# Patient Record
Sex: MALE | Race: WHITE | NOT HISPANIC OR LATINO | Employment: FULL TIME | URBAN - METROPOLITAN AREA
[De-identification: names, ages, dates, MRNs, and addresses within clinical notes are randomized per-mention and may not be internally consistent; named-entity substitution may affect disease eponyms.]

---

## 2017-03-06 ENCOUNTER — HOSPITAL ENCOUNTER (EMERGENCY)
Facility: MEDICAL CENTER | Age: 32
End: 2017-03-06
Attending: EMERGENCY MEDICINE

## 2017-03-06 VITALS
HEART RATE: 78 BPM | RESPIRATION RATE: 14 BRPM | WEIGHT: 161.82 LBS | TEMPERATURE: 96.5 F | SYSTOLIC BLOOD PRESSURE: 133 MMHG | HEIGHT: 69 IN | OXYGEN SATURATION: 99 % | BODY MASS INDEX: 23.97 KG/M2 | DIASTOLIC BLOOD PRESSURE: 69 MMHG

## 2017-03-06 DIAGNOSIS — T20.00XA: ICD-10-CM

## 2017-03-06 DIAGNOSIS — T26.42XA: ICD-10-CM

## 2017-03-06 PROCEDURE — 700101 HCHG RX REV CODE 250: Performed by: EMERGENCY MEDICINE

## 2017-03-06 PROCEDURE — 99284 EMERGENCY DEPT VISIT MOD MDM: CPT

## 2017-03-06 RX ORDER — HYDROCODONE BITARTRATE AND ACETAMINOPHEN 5; 325 MG/1; MG/1
1-2 TABLET ORAL EVERY 4 HOURS PRN
Qty: 10 TAB | Refills: 0 | Status: SHIPPED | OUTPATIENT
Start: 2017-03-06 | End: 2020-03-16

## 2017-03-06 RX ORDER — PROPARACAINE HYDROCHLORIDE 5 MG/ML
1 SOLUTION/ DROPS OPHTHALMIC ONCE
Status: COMPLETED | OUTPATIENT
Start: 2017-03-06 | End: 2017-03-06

## 2017-03-06 RX ORDER — ERYTHROMYCIN 5 MG/G
1 OINTMENT OPHTHALMIC
Qty: 1 TUBE | Refills: 0 | Status: SHIPPED | OUTPATIENT
Start: 2017-03-06

## 2017-03-06 RX ADMIN — PROPARACAINE HYDROCHLORIDE 1 DROP: 5 SOLUTION/ DROPS OPHTHALMIC at 08:00

## 2017-03-06 ASSESSMENT — LIFESTYLE VARIABLES: DO YOU DRINK ALCOHOL: NO

## 2017-03-06 ASSESSMENT — PAIN SCALES - GENERAL: PAINLEVEL_OUTOF10: 0
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
## 2017-03-06 NOTE — DISCHARGE INSTRUCTIONS
Burn Care  Burns hurt your skin. When your skin is hurt, it is easier to get an infection. Follow your doctor's directions to help prevent an infection.  HOME CARE  · Wash your hands well before you change your bandage.  · Change your bandage as often as told by your doctor.  ¨ Remove the old bandage. If the bandage sticks, soak it off with cool, clean water.  ¨ Gently clean the burn with mild soap and water.  ¨ Pat the burn dry with a clean, dry cloth.  ¨ Put a thin layer of medicated cream on the burn.  ¨ Put a clean bandage on as told by your doctor.  ¨ Keep the bandage clean and dry.  · Raise (elevate) the burn for the first 24 hours. After that, follow your doctor's directions.  · Only take medicine as told by your doctor.  GET HELP RIGHT AWAY IF:   · You have too much pain.  · The skin near the burn is red, tender, puffy (swollen), or has red streaks.  · The burn area has yellowish white fluid (pus) or a bad smell coming from it.  · You have a fever.  MAKE SURE YOU:   · Understand these instructions.  · Will watch your condition.  · Will get help right away if you are not doing well or get worse.     This information is not intended to replace advice given to you by your health care provider. Make sure you discuss any questions you have with your health care provider.     Document Released: 09/26/2009 Document Revised: 03/11/2013 Document Reviewed: 05/09/2012  Share[employer] Interactive Patient Education ©2016 [employer].  Corneal Abrasion  The cornea is the clear covering at the front and center of the eye. When you look at the colored portion of the eye, you are looking through the cornea. It is a thin tissue made up of layers. The top layer is the most sensitive layer. A corneal abrasion happens if this layer is scratched or an injury causes it to come off.   HOME CARE  · You may be given drops or a medicated cream. Use the medicine as told by your doctor.  · A pressure patch may be put over the eye. If this is  done, follow your doctor's instructions for when to remove the patch. Do not drive or use machines while the eye patch is on. Judging distances is hard to do with a patch on.  · See your doctor for a follow-up exam if you are told to do so. It is very important that you keep this appointment.  GET HELP IF:   · You have pain, are sensitive to light, and have a scratchy feeling in one eye or both eyes.  · Your pressure patch keeps getting loose. You can blink your eye under the patch.  · You have fluid coming from your eye or the lids stick together in the morning.  · You have the same symptoms in the morning that you did with the first abrasion. This could be days, weeks, or months after the first abrasion healed.  MAKE SURE YOU:   · Understand these instructions.  · Will watch your condition.  · Will get help right away if you are not doing well or get worse.     This information is not intended to replace advice given to you by your health care provider. Make sure you discuss any questions you have with your health care provider.     Document Released: 06/05/2009 Document Revised: 10/08/2014 Document Reviewed: 08/25/2014  RxVault.in Interactive Patient Education ©2016 Elsevier Inc.

## 2017-03-06 NOTE — ED AVS SNAPSHOT
3/6/2017          Saman Borjas  [address] [place] [address]    Dear Saman:    [facility] wants to ensure your discharge home is safe and you or your loved ones have had all your questions answered regarding your care after you leave the hospital.    You may receive a telephone call within two days of your discharge.  This call is to make certain you understand your discharge instructions as well as ensure we provided you with the best care possible during your stay with us.     The call will only last approximately 3-5 minutes and will be done by a nurse.    Once again, we want to ensure your discharge home is safe and that you have a clear understanding of any next steps in your care.  If you have any questions or concerns, please do not hesitate to contact us, we are here for you.  Thank you for choosing [facility] for your healthcare needs.    Sincerely,    Rafael Nash    [facility]
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
## 2017-03-06 NOTE — ED AVS SNAPSHOT
[email or web address].com Access Code: [email or web address]  Expires: 4/5/2017  8:22 AM    Your email address is not on file at [facility].  Email Addresses are required for you to sign up for [email or web address], please contact [phone] to verify your personal information and to provide your email address prior to attempting to register for [email or web address].    Saman Borjas  [address] [place] [address]    MedTel.com  A secure, online tool to manage your health information     [facility]’s MedTel.com® is a secure, online tool that connects you to your personalized health information from the privacy of your home -- day or night - making it very easy for you to manage your healthcare. Once the activation process is completed, you can even access your medical information using the MedTel.com cale, which is available for free in the Apple Cale store or Google Play store.     To learn more about MedTel.com, visit [email or web address]    There are two levels of access available (as shown below):   My Chart Features  [facility] [place] Primary Care Doctor [facility]  Specialists [facility]  Urgent  Care Non-[facility] Primary Care Doctor   Email your healthcare team securely and privately 24/7 X X X    Manage appointments: schedule your next appointment; view details of past/upcoming appointments X      Request prescription refills. X      View recent personal medical records, including lab and immunizations X X X X   View health record, including health history, allergies, medications X X X X   Read reports about your outpatient visits, procedures, consult and ER notes X X X X   See your discharge summary, which is a recap of your hospital and/or ER visit that includes your diagnosis, lab results, and care plan X X  X     How to register for Exact Sciencest:  Once your e-mail address has been verified, follow the following steps to sign up for Exact Sciencest.     1. Go to  [email or web address]  2. Click on the Sign Up Now box, which takes you to the New Member Sign Up page. You will need to  provide the following information:  a. Enter your Next audience Access Code exactly as it appears at the top of this page. (You will not need to use this code after you’ve completed the sign-up process. If you do not sign up before the expiration date, you must request a new code.)   b. Enter your date of birth.   c. Enter your home email address.   d. Click Submit, and follow the next screen’s instructions.  3. Create a Next audience ID. This will be your Next audience login ID and cannot be changed, so think of one that is secure and easy to remember.  4. Create a Next audience password. You can change your password at any time.  5. Enter your Password Reset Question and Answer. This can be used at a later time if you forget your password.   6. Enter your e-mail address. This allows you to receive e-mail notifications when new information is available in Next audience.  7. Click Sign Up. You can now view your health information.    For assistance activating your Next audience account, call [phone]

## 2019-08-14 ENCOUNTER — APPOINTMENT (OUTPATIENT)
Dept: RADIOLOGY | Facility: MEDICAL CENTER | Age: 34
End: 2019-08-14
Attending: EMERGENCY MEDICINE

## 2019-08-14 ENCOUNTER — HOSPITAL ENCOUNTER (EMERGENCY)
Facility: MEDICAL CENTER | Age: 34
End: 2019-08-14
Attending: EMERGENCY MEDICINE

## 2019-08-14 VITALS
DIASTOLIC BLOOD PRESSURE: 63 MMHG | HEIGHT: 69 IN | TEMPERATURE: 99.1 F | BODY MASS INDEX: 25.18 KG/M2 | RESPIRATION RATE: 25 BRPM | SYSTOLIC BLOOD PRESSURE: 109 MMHG | HEART RATE: 88 BPM | WEIGHT: 170 LBS | OXYGEN SATURATION: 94 %

## 2019-08-14 DIAGNOSIS — D72.829 LEUKOCYTOSIS, UNSPECIFIED TYPE: ICD-10-CM

## 2019-08-14 DIAGNOSIS — M54.9 PAIN, UPPER BACK: ICD-10-CM

## 2019-08-14 LAB
ALBUMIN SERPL BCP-MCNC: 4.3 G/DL (ref 3.2–4.9)
ALBUMIN/GLOB SERPL: 1.5 G/DL
ALP SERPL-CCNC: 53 U/L (ref 30–99)
ALT SERPL-CCNC: 13 U/L (ref 2–50)
ANION GAP SERPL CALC-SCNC: 9 MMOL/L (ref 0–11.9)
AST SERPL-CCNC: 17 U/L (ref 12–45)
BASOPHILS # BLD AUTO: 0.3 % (ref 0–1.8)
BASOPHILS # BLD: 0.04 K/UL (ref 0–0.12)
BILIRUB SERPL-MCNC: 0.5 MG/DL (ref 0.1–1.5)
BUN SERPL-MCNC: 12 MG/DL (ref 8–22)
CALCIUM SERPL-MCNC: 9.3 MG/DL (ref 8.5–10.5)
CHLORIDE SERPL-SCNC: 105 MMOL/L (ref 96–112)
CO2 SERPL-SCNC: 22 MMOL/L (ref 20–33)
CREAT SERPL-MCNC: 1.03 MG/DL (ref 0.5–1.4)
D DIMER PPP IA.FEU-MCNC: <0.4 UG/ML (FEU) (ref 0–0.5)
EOSINOPHIL # BLD AUTO: 0.01 K/UL (ref 0–0.51)
EOSINOPHIL NFR BLD: 0.1 % (ref 0–6.9)
ERYTHROCYTE [DISTWIDTH] IN BLOOD BY AUTOMATED COUNT: 41.2 FL (ref 35.9–50)
GLOBULIN SER CALC-MCNC: 2.8 G/DL (ref 1.9–3.5)
GLUCOSE SERPL-MCNC: 146 MG/DL (ref 65–99)
HCT VFR BLD AUTO: 43 % (ref 42–52)
HGB BLD-MCNC: 14.5 G/DL (ref 14–18)
IMM GRANULOCYTES # BLD AUTO: 0.06 K/UL (ref 0–0.11)
IMM GRANULOCYTES NFR BLD AUTO: 0.4 % (ref 0–0.9)
LYMPHOCYTES # BLD AUTO: 0.88 K/UL (ref 1–4.8)
LYMPHOCYTES NFR BLD: 5.8 % (ref 22–41)
MCH RBC QN AUTO: 31.5 PG (ref 27–33)
MCHC RBC AUTO-ENTMCNC: 33.7 G/DL (ref 33.7–35.3)
MCV RBC AUTO: 93.5 FL (ref 81.4–97.8)
MONOCYTES # BLD AUTO: 1.23 K/UL (ref 0–0.85)
MONOCYTES NFR BLD AUTO: 8.1 % (ref 0–13.4)
NEUTROPHILS # BLD AUTO: 12.89 K/UL (ref 1.82–7.42)
NEUTROPHILS NFR BLD: 85.3 % (ref 44–72)
NRBC # BLD AUTO: 0 K/UL
NRBC BLD-RTO: 0 /100 WBC
PLATELET # BLD AUTO: 174 K/UL (ref 164–446)
PMV BLD AUTO: 10.9 FL (ref 9–12.9)
POTASSIUM SERPL-SCNC: 3.7 MMOL/L (ref 3.6–5.5)
PROT SERPL-MCNC: 7.1 G/DL (ref 6–8.2)
RBC # BLD AUTO: 4.6 M/UL (ref 4.7–6.1)
SODIUM SERPL-SCNC: 136 MMOL/L (ref 135–145)
TROPONIN T SERPL-MCNC: <6 NG/L (ref 6–19)
WBC # BLD AUTO: 15.1 K/UL (ref 4.8–10.8)

## 2019-08-14 PROCEDURE — 84484 ASSAY OF TROPONIN QUANT: CPT

## 2019-08-14 PROCEDURE — 80053 COMPREHEN METABOLIC PANEL: CPT

## 2019-08-14 PROCEDURE — 99285 EMERGENCY DEPT VISIT HI MDM: CPT

## 2019-08-14 PROCEDURE — 85379 FIBRIN DEGRADATION QUANT: CPT

## 2019-08-14 PROCEDURE — 71045 X-RAY EXAM CHEST 1 VIEW: CPT

## 2019-08-14 PROCEDURE — 700105 HCHG RX REV CODE 258: Performed by: EMERGENCY MEDICINE

## 2019-08-14 PROCEDURE — 96374 THER/PROPH/DIAG INJ IV PUSH: CPT

## 2019-08-14 PROCEDURE — 94760 N-INVAS EAR/PLS OXIMETRY 1: CPT

## 2019-08-14 PROCEDURE — 700111 HCHG RX REV CODE 636 W/ 250 OVERRIDE (IP): Performed by: EMERGENCY MEDICINE

## 2019-08-14 PROCEDURE — 85025 COMPLETE CBC W/AUTO DIFF WBC: CPT

## 2019-08-14 RX ORDER — KETOROLAC TROMETHAMINE 30 MG/ML
30 INJECTION, SOLUTION INTRAMUSCULAR; INTRAVENOUS ONCE
Status: COMPLETED | OUTPATIENT
Start: 2019-08-14 | End: 2019-08-14

## 2019-08-14 RX ORDER — IBUPROFEN 600 MG/1
600 TABLET ORAL EVERY 6 HOURS PRN
Qty: 20 TAB | Refills: 0 | Status: SHIPPED | OUTPATIENT
Start: 2019-08-14

## 2019-08-14 RX ORDER — SODIUM CHLORIDE 9 MG/ML
1000 INJECTION, SOLUTION INTRAVENOUS ONCE
Status: COMPLETED | OUTPATIENT
Start: 2019-08-14 | End: 2019-08-14

## 2019-08-14 RX ORDER — CYCLOBENZAPRINE HCL 10 MG
10 TABLET ORAL 3 TIMES DAILY PRN
Qty: 30 TAB | Refills: 0 | Status: SHIPPED | OUTPATIENT
Start: 2019-08-14 | End: 2020-03-16

## 2019-08-14 RX ADMIN — SODIUM CHLORIDE 1000 ML: 9 INJECTION, SOLUTION INTRAVENOUS at 09:29

## 2019-08-14 RX ADMIN — KETOROLAC TROMETHAMINE 30 MG: 30 INJECTION, SOLUTION INTRAMUSCULAR at 09:37

## 2019-08-14 NOTE — ED NOTES
Understanding of dc paperwork. Iv removed. Bleeding controlled. Bandage placed. Steady gait out of er

## 2019-08-14 NOTE — ED PROVIDER NOTES
ED Provider Note    Scribed for Chato Tomlinson M.D. by Rabia Traore. 8/14/2019  9:06 AM    Primary care provider: None noted  Means of arrival: EMS  History obtained from: Patient  History limited by: None    CHIEF COMPLAINT  Chief Complaint   Patient presents with   • Chest Pain     pt is complaining of upper rib pain radiatating to scapula area. woke up this morning today with pain. no hx. no trauma       HPI  Saman Borjas is a 34 y.o. male who presents to the Emergency Department for evaluation of acute, constant, severe right upper rib pain onset this morning. The patient notes that he went to sleep at his baseline but woke up with severe pain. He denies sleeping in any abnormal positions or in a new bed. His right upper rib pain radiates to his right shoulder blade and he describes his pain as sharp in nature. He states that his pain is exacerbated upon inhalation. Endorses lower back pain which he notes is his baseline. Negative recent traumas, cough, fever or abdominal pain. The patient has no major past medical history, takes no daily medications, and has no known allergies to medication. Denies past surgical history.       REVIEW OF SYSTEMS  Pertinent positives include rib pain, shoulder pain.   Pertinent negatives include no recent traumas, cough, fever or abdominal pain.    All other systems reviewed and negative. See HPI for further details.       PAST MEDICAL HISTORY  No major past medical history was reported.   SURGICAL HISTORY  patient denies any surgical history    SOCIAL HISTORY  Social History     Tobacco Use   • Smoking status: Current Every Day Smoker     Packs/day: 0.50     Types: Cigarettes   Substance Use Topics   • Alcohol use: Yes   • Drug use: Yes     Types: Inhaled      Social History     Substance and Sexual Activity   Drug Use Yes   • Types: Inhaled       FAMILY HISTORY  No family history reported.     CURRENT MEDICATIONS  The patient denies taking any daily medications.  "  ALLERGIES  No Known Allergies    PHYSICAL EXAM  VITAL SIGNS: /80   Pulse 86   Temp 37.3 °C (99.1 °F) (Tympanic)   Resp 16   Ht 1.753 m (5' 9\")   Wt 77.1 kg (170 lb)   SpO2 97%   BMI 25.10 kg/m²     Nursing note and vitals reviewed.  Constitutional: Well-developed and well-nourished. Mild distress secondary to pain.   HENT: Head is normocephalic and atraumatic. Oropharynx is clear and moist without exudate or erythema.   Eyes: Pupils are equal, round, and reactive to light. Conjunctiva are normal.   Cardiovascular: Normal rate and regular rhythm. No murmur heard. Normal radial pulses.   Pulmonary/Chest: Breath sounds normal. No wheezes or rales. Tenderness of the right thoracic paraspinal musculature. Noticeable discomfort with deep breath  Abdominal: Soft and non-tender. No distention   Musculoskeletal: Extremities exhibit normal range of motion without edema or tenderness. No calf tenderness or palpable cords. See chest.   Neurological: Awake, alert and oriented to person, place, and time. No focal deficits noted.  Skin: Skin is warm and dry. No rash.   Psychiatric: Normal mood and affect. Appropriate for clinical situation    DIAGNOSTIC STUDIES / PROCEDURES    LABS  Results for orders placed or performed during the hospital encounter of 08/14/19   CBC WITH DIFFERENTIAL   Result Value Ref Range    WBC 15.1 (H) 4.8 - 10.8 K/uL    RBC 4.60 (L) 4.70 - 6.10 M/uL    Hemoglobin 14.5 14.0 - 18.0 g/dL    Hematocrit 43.0 42.0 - 52.0 %    MCV 93.5 81.4 - 97.8 fL    MCH 31.5 27.0 - 33.0 pg    MCHC 33.7 33.7 - 35.3 g/dL    RDW 41.2 35.9 - 50.0 fL    Platelet Count 174 164 - 446 K/uL    MPV 10.9 9.0 - 12.9 fL    Neutrophils-Polys 85.30 (H) 44.00 - 72.00 %    Lymphocytes 5.80 (L) 22.00 - 41.00 %    Monocytes 8.10 0.00 - 13.40 %    Eosinophils 0.10 0.00 - 6.90 %    Basophils 0.30 0.00 - 1.80 %    Immature Granulocytes 0.40 0.00 - 0.90 %    Nucleated RBC 0.00 /100 WBC    Neutrophils (Absolute) 12.89 (H) 1.82 - 7.42 " K/uL    Lymphs (Absolute) 0.88 (L) 1.00 - 4.80 K/uL    Monos (Absolute) 1.23 (H) 0.00 - 0.85 K/uL    Eos (Absolute) 0.01 0.00 - 0.51 K/uL    Baso (Absolute) 0.04 0.00 - 0.12 K/uL    Immature Granulocytes (abs) 0.06 0.00 - 0.11 K/uL    NRBC (Absolute) 0.00 K/uL   COMP METABOLIC PANEL   Result Value Ref Range    Sodium 136 135 - 145 mmol/L    Potassium 3.7 3.6 - 5.5 mmol/L    Chloride 105 96 - 112 mmol/L    Co2 22 20 - 33 mmol/L    Anion Gap 9.0 0.0 - 11.9    Glucose 146 (H) 65 - 99 mg/dL    Bun 12 8 - 22 mg/dL    Creatinine 1.03 0.50 - 1.40 mg/dL    Calcium 9.3 8.5 - 10.5 mg/dL    AST(SGOT) 17 12 - 45 U/L    ALT(SGPT) 13 2 - 50 U/L    Alkaline Phosphatase 53 30 - 99 U/L    Total Bilirubin 0.5 0.1 - 1.5 mg/dL    Albumin 4.3 3.2 - 4.9 g/dL    Total Protein 7.1 6.0 - 8.2 g/dL    Globulin 2.8 1.9 - 3.5 g/dL    A-G Ratio 1.5 g/dL   TROPONIN   Result Value Ref Range    Troponin T <6 6 - 19 ng/L   D-DIMER   Result Value Ref Range    D-Dimer Screen <0.40 0.00 - 0.50 ug/mL (FEU)   ESTIMATED GFR   Result Value Ref Range    GFR If African American >60 >60 mL/min/1.73 m 2    GFR If Non African American >60 >60 mL/min/1.73 m 2       All labs reviewed by me.    RADIOLOGY  DX-CHEST-PORTABLE (1 VIEW)   Final Result      No pulmonary consolidation.        The radiologist's interpretation of all radiological studies have been reviewed by me.    COURSE & MEDICAL DECISION MAKING  Nursing notes, VS, PMSFHx reviewed in chart.       9:06 AM - Patient seen and examined at bedside. Plan of care was discussed with patient which includes treating him with IV fluids and Toradol for his symptoms. Lab work and imaging will also be performed to further evaluate his condition. Patient will be treated with Toradol 30 mg . Ordered D-Dimer, troponin, CMP, CBC with differential, Dx-chest to evaluate his symptoms. The differential diagnoses include but are not limited to: pneumonia, pneumothorax, pulmonary embolism, musculoskeletal pain     D-dimer is  negative effectively ruling out pulmonary embolism in this low risk patient.  Troponin is not elevated.  Chest x-ray does not demonstrate any evidence of pneumonia or pneumothorax.    10:43 AM Obtained the patient's lab results and radiology results which were overall unremarkable.     11:04 AM - The patient is stable for discharge at this time. I discussed his above findings were overall unremarkable and plans for discharge with a prescription for Motrin 600 mg which is to be taken every six hours as needed and Flexeril 10 mg which is to be taken up to three times daily as needed. He was instructed to return to the ED if his symptoms worsen. The patient will return for new or worsening symptoms and is stable at the time of discharge. Patient understands and agrees.    DISPOSITION:  Patient will be discharged home in stable condition.    FOLLOW UP:  [facility], Emergency Dept  [address] [facility] [address]  [phone]  Schedule an appointment as soon as possible for a visit       [address] [address]  [phone]  Schedule an appointment as soon as possible for a visit         OUTPATIENT MEDICATIONS:  Discharge Medication List as of 8/14/2019 10:56 AM      START taking these medications    Details   ibuprofen (MOTRIN) 600 MG Tab Take 1 Tab by mouth every 6 hours as needed., Disp-20 Tab, R-0, Print Rx Paper      cyclobenzaprine (FLEXERIL) 10 MG Tab Take 1 Tab by mouth 3 times a day as needed., Disp-30 Tab, R-0, Print Rx Paper               FINAL IMPRESSION  1. Pain, upper back    2. Leukocytosis, unspecified type          I, Rabia Traore (Roxana), am scribing for, and in the presence of, Chato Tomlinson M.D..    Electronically signed by: Rabia Traore (Roxana), 8/14/2019    IChato M.D. personally performed the services described in this documentation, as scribed by Rabia Traore in my presence, and it is both accurate and  complete.    C    The note accurately reflects work and decisions made by me.  Chato Tomlinson  8/14/2019  3:43 PM

## 2019-08-14 NOTE — ED TRIAGE NOTES
pt is complaining of upper rib pain radiatating to scapula area. woke up this morning today with pain. no hx. no trauma      ..  Vitals:    08/14/19 0901   BP: 127/80   Pulse: 86   Resp: 16   Temp: 37.3 °C (99.1 °F)   SpO2: 97%

## 2020-01-08 ENCOUNTER — HOSPITAL ENCOUNTER (EMERGENCY)
Facility: MEDICAL CENTER | Age: 35
End: 2020-01-08
Attending: EMERGENCY MEDICINE

## 2020-01-08 ENCOUNTER — APPOINTMENT (OUTPATIENT)
Dept: RADIOLOGY | Facility: MEDICAL CENTER | Age: 35
End: 2020-01-08
Attending: EMERGENCY MEDICINE

## 2020-01-08 VITALS
BODY MASS INDEX: 33.34 KG/M2 | TEMPERATURE: 96.8 F | RESPIRATION RATE: 18 BRPM | OXYGEN SATURATION: 98 % | HEART RATE: 62 BPM | WEIGHT: 220 LBS | DIASTOLIC BLOOD PRESSURE: 72 MMHG | HEIGHT: 68 IN | SYSTOLIC BLOOD PRESSURE: 118 MMHG

## 2020-01-08 DIAGNOSIS — R11.10 VOMITING AND DIARRHEA: ICD-10-CM

## 2020-01-08 DIAGNOSIS — R19.7 VOMITING AND DIARRHEA: ICD-10-CM

## 2020-01-08 DIAGNOSIS — R10.33 PERIUMBILICAL ABDOMINAL PAIN: ICD-10-CM

## 2020-01-08 LAB
ALBUMIN SERPL BCP-MCNC: 4.3 G/DL (ref 3.2–4.9)
ALBUMIN/GLOB SERPL: 1.8 G/DL
ALP SERPL-CCNC: 57 U/L (ref 30–99)
ALT SERPL-CCNC: 34 U/L (ref 2–50)
AMPHET UR QL SCN: NEGATIVE
ANION GAP SERPL CALC-SCNC: 9 MMOL/L (ref 0–11.9)
AST SERPL-CCNC: 28 U/L (ref 12–45)
BARBITURATES UR QL SCN: NEGATIVE
BASOPHILS # BLD AUTO: 0.4 % (ref 0–1.8)
BASOPHILS # BLD: 0.05 K/UL (ref 0–0.12)
BENZODIAZ UR QL SCN: NEGATIVE
BILIRUB SERPL-MCNC: 0.5 MG/DL (ref 0.1–1.5)
BUN SERPL-MCNC: 18 MG/DL (ref 8–22)
BZE UR QL SCN: NEGATIVE
CALCIUM SERPL-MCNC: 9.3 MG/DL (ref 8.5–10.5)
CANNABINOIDS UR QL SCN: POSITIVE
CHLORIDE SERPL-SCNC: 107 MMOL/L (ref 96–112)
CO2 SERPL-SCNC: 24 MMOL/L (ref 20–33)
CREAT SERPL-MCNC: 1.02 MG/DL (ref 0.5–1.4)
EOSINOPHIL # BLD AUTO: 0.06 K/UL (ref 0–0.51)
EOSINOPHIL NFR BLD: 0.4 % (ref 0–6.9)
ERYTHROCYTE [DISTWIDTH] IN BLOOD BY AUTOMATED COUNT: 38.8 FL (ref 35.9–50)
GLOBULIN SER CALC-MCNC: 2.4 G/DL (ref 1.9–3.5)
GLUCOSE SERPL-MCNC: 141 MG/DL (ref 65–99)
HCT VFR BLD AUTO: 42.3 % (ref 42–52)
HGB BLD-MCNC: 14.6 G/DL (ref 14–18)
IMM GRANULOCYTES # BLD AUTO: 0.04 K/UL (ref 0–0.11)
IMM GRANULOCYTES NFR BLD AUTO: 0.3 % (ref 0–0.9)
LIPASE SERPL-CCNC: 14 U/L (ref 11–82)
LYMPHOCYTES # BLD AUTO: 2.13 K/UL (ref 1–4.8)
LYMPHOCYTES NFR BLD: 15.4 % (ref 22–41)
MCH RBC QN AUTO: 31.4 PG (ref 27–33)
MCHC RBC AUTO-ENTMCNC: 34.5 G/DL (ref 33.7–35.3)
MCV RBC AUTO: 91 FL (ref 81.4–97.8)
METHADONE UR QL SCN: NEGATIVE
MONOCYTES # BLD AUTO: 0.91 K/UL (ref 0–0.85)
MONOCYTES NFR BLD AUTO: 6.6 % (ref 0–13.4)
NEUTROPHILS # BLD AUTO: 10.61 K/UL (ref 1.82–7.42)
NEUTROPHILS NFR BLD: 76.9 % (ref 44–72)
NRBC # BLD AUTO: 0 K/UL
NRBC BLD-RTO: 0 /100 WBC
OPIATES UR QL SCN: NEGATIVE
OXYCODONE UR QL SCN: NEGATIVE
PCP UR QL SCN: NEGATIVE
PLATELET # BLD AUTO: 214 K/UL (ref 164–446)
PMV BLD AUTO: 10.8 FL (ref 9–12.9)
POTASSIUM SERPL-SCNC: 3.7 MMOL/L (ref 3.6–5.5)
PROPOXYPH UR QL SCN: NEGATIVE
PROT SERPL-MCNC: 6.7 G/DL (ref 6–8.2)
RBC # BLD AUTO: 4.65 M/UL (ref 4.7–6.1)
SODIUM SERPL-SCNC: 140 MMOL/L (ref 135–145)
WBC # BLD AUTO: 13.8 K/UL (ref 4.8–10.8)

## 2020-01-08 PROCEDURE — 700111 HCHG RX REV CODE 636 W/ 250 OVERRIDE (IP): Performed by: EMERGENCY MEDICINE

## 2020-01-08 PROCEDURE — 700105 HCHG RX REV CODE 258: Performed by: EMERGENCY MEDICINE

## 2020-01-08 PROCEDURE — 80053 COMPREHEN METABOLIC PANEL: CPT

## 2020-01-08 PROCEDURE — 83690 ASSAY OF LIPASE: CPT

## 2020-01-08 PROCEDURE — 74019 RADEX ABDOMEN 2 VIEWS: CPT

## 2020-01-08 PROCEDURE — 85025 COMPLETE CBC W/AUTO DIFF WBC: CPT

## 2020-01-08 PROCEDURE — 96374 THER/PROPH/DIAG INJ IV PUSH: CPT

## 2020-01-08 PROCEDURE — 80307 DRUG TEST PRSMV CHEM ANLYZR: CPT

## 2020-01-08 PROCEDURE — 99285 EMERGENCY DEPT VISIT HI MDM: CPT

## 2020-01-08 RX ORDER — SODIUM CHLORIDE 9 MG/ML
1000 INJECTION, SOLUTION INTRAVENOUS ONCE
Status: COMPLETED | OUTPATIENT
Start: 2020-01-08 | End: 2020-01-08

## 2020-01-08 RX ORDER — LOPERAMIDE HYDROCHLORIDE 2 MG/1
TABLET ORAL
Qty: 20 TAB | Refills: 0 | Status: SHIPPED | OUTPATIENT
Start: 2020-01-08 | End: 2020-03-16

## 2020-01-08 RX ORDER — ONDANSETRON 2 MG/ML
4 INJECTION INTRAMUSCULAR; INTRAVENOUS
Status: DISCONTINUED | OUTPATIENT
Start: 2020-01-08 | End: 2020-01-08 | Stop reason: HOSPADM

## 2020-01-08 RX ORDER — ONDANSETRON 4 MG/1
4 TABLET, ORALLY DISINTEGRATING ORAL EVERY 8 HOURS PRN
Qty: 10 TAB | Refills: 0 | Status: SHIPPED | OUTPATIENT
Start: 2020-01-08 | End: 2023-06-25

## 2020-01-08 RX ADMIN — ONDANSETRON 4 MG: 2 INJECTION INTRAMUSCULAR; INTRAVENOUS at 17:34

## 2020-01-08 RX ADMIN — SODIUM CHLORIDE 1000 ML: 9 INJECTION, SOLUTION INTRAVENOUS at 17:34

## 2020-01-09 NOTE — ED TRIAGE NOTES
Pt. States vomiting starting today at approx 0800, pt. States approximately 30-40 episodes of vomiting or dry heaves. Pt. Denies trauma.

## 2020-01-09 NOTE — DISCHARGE INSTRUCTIONS
Vomiting    Take Zofran as needed for vomiting.  Drink frequent small volumes of fluids increasing the amounts each hour that you do not vomit.  Take lomotil or imodium OTC for diarrhea.  Return for abdominal pain (especially right sided), bloody vomit or stool or for uncontrolled vomiting or ill appearance.  See your doctor if not better in 3-4 days.    You had a borderline or high normal blood pressure reading today.  This does not necessarily mean you have hypertension.  Please followup with your/a primary physician for comprehensive blood pressure evaluation and yearly fasting cholesterol assessment.  BP Readings from Last 3 Encounters:   01/08/20 125/72   08/14/19 109/63   03/06/17 133/69          Vomiting can be caused by many different medical problems (stomach trouble, nervous system problems, alcohol and drug toxicity, and infections). Whatever the cause, repeated vomiting can lead to dehydration and severe weakness.  The treatment for vomiting includes:        Stay in bed. Do not drink or eat anything for at least 1 hour, or until the stomach settles.  Start drinking small amounts of clear liquids (water, flat sodas, Gatorade, diluted juices, broths, or herb tea) as tolerated.  Medication to stop vomiting (anti-emetic drugs) may be given by injection, rectal suppository, or orally, as needed.  After you can keep down clear liquids, other light foods (gelatin, soup, bread) can be started. Avoid alcohol, dairy products, and richer foods for several days until you are completely better.    Please call your doctor right away if your condition is not better in 1-2 days.  Call right away or go to the emergency room if you cannot take any oral fluids, if you vomit blood, if you have increased pain, fainting, dehydration, fever, or other serious symptoms.    Document Released: 12/18/2006    [employer]® Patient Information ©2008 [employer].
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
## 2020-03-15 ENCOUNTER — HOSPITAL ENCOUNTER (EMERGENCY)
Facility: MEDICAL CENTER | Age: 35
End: 2020-03-15
Attending: EMERGENCY MEDICINE

## 2020-03-15 VITALS
RESPIRATION RATE: 16 BRPM | TEMPERATURE: 97.7 F | OXYGEN SATURATION: 97 % | DIASTOLIC BLOOD PRESSURE: 76 MMHG | BODY MASS INDEX: 28.88 KG/M2 | HEIGHT: 69 IN | WEIGHT: 195 LBS | SYSTOLIC BLOOD PRESSURE: 121 MMHG | HEART RATE: 87 BPM

## 2020-03-15 DIAGNOSIS — L01.00 IMPETIGO: ICD-10-CM

## 2020-03-15 DIAGNOSIS — J02.0 STREP PHARYNGITIS: ICD-10-CM

## 2020-03-15 LAB
FLUAV RNA SPEC QL NAA+PROBE: NEGATIVE
FLUBV RNA SPEC QL NAA+PROBE: NEGATIVE
S PYO DNA SPEC NAA+PROBE: DETECTED

## 2020-03-15 PROCEDURE — 87651 STREP A DNA AMP PROBE: CPT

## 2020-03-15 PROCEDURE — 87502 INFLUENZA DNA AMP PROBE: CPT

## 2020-03-15 PROCEDURE — 99283 EMERGENCY DEPT VISIT LOW MDM: CPT

## 2020-03-15 RX ORDER — AMOXICILLIN 500 MG/1
500 CAPSULE ORAL 3 TIMES DAILY
Qty: 30 CAP | Refills: 0 | Status: SHIPPED | OUTPATIENT
Start: 2020-03-15 | End: 2020-03-15 | Stop reason: SDUPTHER

## 2020-03-15 RX ORDER — AMOXICILLIN 500 MG/1
500 CAPSULE ORAL 3 TIMES DAILY
Qty: 30 CAP | Refills: 0 | Status: SHIPPED | OUTPATIENT
Start: 2020-03-15 | End: 2020-03-25

## 2020-03-15 RX ORDER — VALACYCLOVIR HYDROCHLORIDE 1 G/1
1000 TABLET, FILM COATED ORAL 2 TIMES DAILY
Qty: 20 TAB | Refills: 0 | Status: SHIPPED | OUTPATIENT
Start: 2020-03-15 | End: 2022-05-03

## 2020-03-15 RX ORDER — VALACYCLOVIR HYDROCHLORIDE 1 G/1
1000 TABLET, FILM COATED ORAL 2 TIMES DAILY
Qty: 20 TAB | Refills: 0 | Status: SHIPPED | OUTPATIENT
Start: 2020-03-15 | End: 2020-03-15 | Stop reason: SDUPTHER

## 2020-03-15 ASSESSMENT — FIBROSIS 4 INDEX: FIB4 SCORE: 0.72

## 2020-03-15 NOTE — ED PROVIDER NOTES
"ED Provider Note    Scribed for Sharita Leigh M.D. by Oli Berg. 3/15/2020  11:47 AM    Primary care provider: None noted  Means of arrival: Walk-in  History obtained from: Patient  History limited by: None    CHIEF COMPLAINT  Chief Complaint   Patient presents with   • Cold Symptoms   • Dizziness       HPI  Saman Borjas is a 34 y.o. male who presents to the Emergency Department for flu like symptoms onset this morning. Patient notes that he is experiencing dizziness, sore throat, cough and rhinorrhea. Patient adds that he has a blister on his lip and that both of his ears feel \"full\". Patient denies any shortness of breath or fever. Patient notes no alleviating or exacerbating factors at this time.  He has not been exposed to anyone with COVID 19 and has had no recent travel to endemic areas.    REVIEW OF SYSTEMS  HEENT:  Sore throat, rhinorrhea,  ear pain no congestion  EYES: no discharge, redness, or vision changes  CARDIAC: no chest pain, no palpitations    PULMONARY: cough, no dyspnea or congestion   GI: no vomiting, diarrhea, or abdominal pain   : no dysuria, back pain, or hematuria   Neuro: Dizziness, no weakness, numbness, aphasia, or headache  Musculoskeletal: no swelling, deformity, pain, or joint swelling  Endocrine: no fevers, sweating, or weight loss   SKIN: blister on lip, no contusions     See history of present illness. All other systems are negative. C.    PAST MEDICAL HISTORY  None noted    SURGICAL HISTORY  patient denies any surgical history    SOCIAL HISTORY  Social History     Tobacco Use   • Smoking status: Former Smoker     Packs/day: 0.50     Types: Cigarettes   • Smokeless tobacco: Never Used   Substance Use Topics   • Alcohol use: Yes   • Drug use: Yes     Types: Inhaled     Comment: Marijuana      Social History     Substance and Sexual Activity   Drug Use Yes   • Types: Inhaled    Comment: Marijuana       FAMILY HISTORY  None noted    CURRENT MEDICATIONS  Home Medications     " "Reviewed by Kathy Recinos R.N. (Registered Nurse) on 03/15/20 at 1145  Med List Status: Not Addressed   Medication Last Dose Status   cyclobenzaprine (FLEXERIL) 10 MG Tab  Active   erythromycin 5 MG/GM Ointment  Active   hydrocodone-acetaminophen (NORCO) 5-325 MG Tab per tablet  Active   ibuprofen (MOTRIN) 600 MG Tab  Active   loperamide (IMODIUM A-D) 2 MG tablet  Active   ondansetron (ZOFRAN ODT) 4 MG TABLET DISPERSIBLE  Active                ALLERGIES  No Known Allergies    PHYSICAL EXAM  VITAL SIGNS: /76   Pulse 87   Temp 36.5 °C (97.7 °F) (Oral)   Resp 16   Ht 1.753 m (5' 9\")   Wt 88.5 kg (195 lb)   SpO2 97%   BMI 28.80 kg/m²     Constitutional: Alert, awake, Well developed, Well nourished, No acute distress, Non-toxic appearance.   HEENT: Rhinorrhea, mucosal edema, pharynx mildly erythematous, Normocephalic, Atraumatic, external ears normal, Mucous  Membranes moist.  Eyes: PERRL, EOMI, Conjunctiva normal, No discharge.   Neck: Normal range of motion, No tenderness, Supple, No stridor.   Lymphatic: No lymphadenopathy    Cardiovascular: Regular Rate and Rhythm, No murmurs,  rubs, or gallops.   Thorax & Lungs: Lungs clear to auscultation bilaterally, No respiratory distress, No wheezes, rhales or rhonchi, No chest wall tenderness.   Abdomen: Bowel sounds normal, Soft, non tender, non distended,  No pulsatile masses., no rebound guarding or peritoneal signs.   Skin: Cluster of vesicular lesions located on left side of face near nostril without drainage crusting or erythema or fluctuance, Warm, Dry  Back:  No CVA tenderness,  No spinal tenderness, bony crepitance, step offs, or instability.   Neurologic: Alert & oriented x 3, Normal motor function, Normal sensory function, No focal deficits noted. Normal reflexes. Normal Cranial Nerves.  Extremities: Equal, intact distal pulses, No cyanosis, clubbing or edema,  No tenderness.   Musculoskeletal: Good range of motion in all major joints. No tenderness " to palpation or major deformities noted.     DIAGNOSTIC STUDIES / PROCEDURES    LABS  Results for orders placed or performed during the hospital encounter of 03/15/20   Group A Strep by PCR   Result Value Ref Range    Group A Strep by PCR DETECTED (A) Not Detected   Influenza A/B By PCR (Adult - Flu Only)   Result Value Ref Range    Influenza virus A RNA Negative Negative    Influenza virus B, PCR Negative Negative       All labs reviewed by me.    COURSE & MEDICAL DECISION MAKING  Nursing notes, VS, PMSFHx reviewed in chart.    11:47 AM Patient seen and examined at bedside.  Ordered Group A strep by PCR and influenza A/B by PCR to evaluate his symptoms. The differential diagnoses include but are not limited to: Herpes Zoster vs. Strep throat vs. impetigo vs. Flu    1:30 PM - Patient was reevaluated at bedside. Discussed lab  results with the patient and informed them that they were to be discharged. Patient verbalized his understanding and agreement with the plan of care.      The patient will return for new or worsening symptoms and is stable at the time of discharge.    The patient is referred to a primary physician for blood pressure management, diabetic screening, and for all other preventative health concerns.      DISPOSITION:  Patient will be discharged home in stable condition.    FOLLOW UP:  [address] [address] [phone]  Call in 2 days  As needed, If symptoms worsen      OUTPATIENT MEDICATIONS:  Discharge Medication List as of 3/15/2020  1:21 PM      START taking these medications    Details   valacyclovir (VALTREX) 1 GM Tab Take 1 Tab by mouth 2 times a day., Disp-20 Tab, R-0, Normal      mupirocin (BACTROBAN) 2 % Ointment Apply 1 Application to affected area(s) 2 times a day for 7 days., Disp-1 Tube, R-0, Normal      amoxicillin (AMOXIL) 500 MG Cap Take 1 Cap by mouth 3 times a day for 10 days., Disp-30 Cap, R-0, Normal               FINAL  IMPRESSION  1. Strep pharyngitis    2. Impetigo          Oli ALAN (Scribe), am scribing for, and in the presence of, Sharita Leigh M.D..    Electronically signed by: Oli Berg (Roxana), 3/15/2020    Sharita ALAN M.D. personally performed the services described in this documentation, as scribed by Oli Berg in my presence, and it is both accurate and complete. E    The note accurately reflects work and decisions made by me.  Sharita Leigh M.D.  3/15/2020  2:37 PM

## 2020-03-15 NOTE — ED TRIAGE NOTES
Patient comes in with complaints of cold like symptoms that started today along with dizziness.  Positive nasal congestion, ear pain, smokes.

## 2020-03-16 ENCOUNTER — HOSPITAL ENCOUNTER (EMERGENCY)
Facility: MEDICAL CENTER | Age: 35
End: 2020-03-16
Attending: EMERGENCY MEDICINE

## 2020-03-16 VITALS
TEMPERATURE: 97.6 F | BODY MASS INDEX: 27.18 KG/M2 | WEIGHT: 184.08 LBS | SYSTOLIC BLOOD PRESSURE: 116 MMHG | RESPIRATION RATE: 18 BRPM | HEART RATE: 62 BPM | DIASTOLIC BLOOD PRESSURE: 75 MMHG | OXYGEN SATURATION: 96 %

## 2020-03-16 DIAGNOSIS — L01.00 IMPETIGO: ICD-10-CM

## 2020-03-16 DIAGNOSIS — J02.0 STREP THROAT: ICD-10-CM

## 2020-03-16 PROCEDURE — A9270 NON-COVERED ITEM OR SERVICE: HCPCS | Performed by: EMERGENCY MEDICINE

## 2020-03-16 PROCEDURE — 99284 EMERGENCY DEPT VISIT MOD MDM: CPT

## 2020-03-16 PROCEDURE — 700102 HCHG RX REV CODE 250 W/ 637 OVERRIDE(OP): Performed by: EMERGENCY MEDICINE

## 2020-03-16 RX ORDER — CEPHALEXIN 500 MG/1
500 CAPSULE ORAL ONCE
Status: COMPLETED | OUTPATIENT
Start: 2020-03-16 | End: 2020-03-16

## 2020-03-16 RX ORDER — CEPHALEXIN 500 MG/1
500 TABLET ORAL EVERY 6 HOURS
Qty: 28 TAB | Refills: 0 | Status: SHIPPED | OUTPATIENT
Start: 2020-03-16 | End: 2022-05-03

## 2020-03-16 RX ADMIN — CEPHALEXIN 500 MG: 500 CAPSULE ORAL at 15:31

## 2020-03-16 ASSESSMENT — FIBROSIS 4 INDEX: FIB4 SCORE: 0.72

## 2020-03-16 ASSESSMENT — PAIN SCALES - WONG BAKER: WONGBAKER_NUMERICALRESPONSE: DOESN'T HURT AT ALL

## 2020-03-16 NOTE — ED PROVIDER NOTES
ED Provider Note    CHIEF COMPLAINT  Chief Complaint   Patient presents with   • Follow-Up       HPI  Saman Borjas Jr. is a 34 y.o. male who presents because he was diagnosed with strep throat and impetigo yesterday and he was unable to afford mupirocin, valacyclovir and amoxicillin.  Patient's had sore throat with headache and congestion for 3 days without fever.  Has had a rash since yesterday that is now developed a second area on his face.  No blisters or history of herpes.  No travel, dyspnea or ill contacts with coronavirus infection    REVIEW OF SYSTEMS  Pertinent positives include: Strep throat and impetigo.  Pertinent negatives include: Fever, shortness of breath.    PAST MEDICAL HISTORY  Denies    SOCIAL HISTORY    Social History     Tobacco Use   • Smoking status: Former Smoker     Packs/day: 0.50     Types: Cigarettes   • Smokeless tobacco: Never Used   Substance Use Topics   • Alcohol use: Yes   • Drug use: Yes     Types: Inhaled     Comment: Marijuana     .    CURRENT MEDICATIONS  Home Medications     Reviewed by Randee Andrade R.N. (Registered Nurse) on 03/16/20 at 1421  Med List Status: Partial   Medication Last Dose Status   amoxicillin (AMOXIL) 500 MG Cap  Active   erythromycin 5 MG/GM Ointment  Active   ibuprofen (MOTRIN) 600 MG Tab  Active   mupirocin (BACTROBAN) 2 % Ointment  Active   ondansetron (ZOFRAN ODT) 4 MG TABLET DISPERSIBLE  Active   valacyclovir (VALTREX) 1 GM Tab  Active                ALLERGIES  No Known Allergies    PHYSICAL EXAM  VITAL SIGNS: /75   Pulse 63   Temp 36.4 °C (97.6 °F) (Temporal)   Resp 16   Wt 83.5 kg (184 lb 1.4 oz)   SpO2 96%   BMI 27.18 kg/m²   Constitutional :  Well developed, Well nourished, afebrile, no hypoxia room air well-appearing normal voice.   HNT: Oropharynx moist with minimal pharyngeal erythema but no exudate or edema.   Ears: External ears normal.  Eyes: pupils reactive without eye discharge nor conjunctival  hyperemia.  Cardiovascular: Regular rhythm, No murmurs, No rubs, No gallops.  No cyanosis.   Respiratory: No rales, rhonchi, wheeze  Abdomen:  Soft, nontender  Skin: 2 patches to 1.5 cm of raised pink crusted skin left cheek, no vesicle.   Musculoskeletal: no limb deformities.      LABORATORY: Labs from yesterday reviewed as below  Results for orders placed or performed during the hospital encounter of 03/15/20   Group A Strep by PCR   Result Value Ref Range    Group A Strep by PCR DETECTED (A) Not Detected   Influenza A/B By PCR (Adult - Flu Only)   Result Value Ref Range    Influenza virus A RNA Negative Negative    Influenza virus B, PCR Negative Negative       COURSE & MEDICAL DECISION MAKING  Old chart reviewed and his physician yesterday thought he had strep throat and impetigo more than herpes    Well-appearing patient presents with strep throat and impetigo without evidence of herpes or airway abscess.  He is low risk for coronavirus infection.      PLAN:  New Prescriptions    CEPHALEXIN 500 MG TAB    Take 1 Tab by mouth every 6 hours.   First dose of cephalexin given here    Discontinue mupirocin, amoxicillin, valacyclovir    Return for your to improve in 3 to 4 days, shortness of breath, difficulty swallowing    [facility], Emergency Dept  [facility] [facility] [address] [phone]    As needed if not better 3 days      CONDITION:  Good.    FINAL IMPRESSION:  1. Strep throat    2. Impetigo      I wore surgical mask eye protection and gloves during the encounter    Electronically signed by: Reymundo Braden M.D., 3/16/2020

## 2020-03-16 NOTE — ED TRIAGE NOTES
Pt reporting yesterday he was diagnosed with streph throat and impetigo and he couldn't afford the medications RX'd to him yesterday.

## 2020-03-16 NOTE — DISCHARGE INSTRUCTIONS
Discontinue amoxicillin, mupirocin and valacyclovir.  Instead take Keflex as prescribed starting in 6 hours.  Wash the rash with soap and water twice daily.  Return for shortness of breath, inability to swallow or failure to improve in 3 to 4 days.

## 2020-03-16 NOTE — ED NOTES
D/c papers given pt verbalized understanding.  Prescription x1 given.  Pt understands all followup information and denies further questions.  Pt resp even ul, skin warm and dry, AAOX4, no acute distress noted. Pt ambulates to ED exit with steady gait, d/c to self.

## 2021-04-14 ENCOUNTER — IMMUNIZATION (OUTPATIENT)
Dept: FAMILY PLANNING/WOMEN'S HEALTH CLINIC | Facility: IMMUNIZATION CENTER | Age: 36
End: 2021-04-14
Payer: OTHER GOVERNMENT

## 2021-04-14 DIAGNOSIS — Z23 ENCOUNTER FOR VACCINATION: Primary | ICD-10-CM

## 2021-04-14 PROCEDURE — 91300 PFIZER SARS-COV-2 VACCINE: CPT

## 2021-04-14 PROCEDURE — 0001A PFIZER SARS-COV-2 VACCINE: CPT

## 2021-05-07 ENCOUNTER — IMMUNIZATION (OUTPATIENT)
Dept: FAMILY PLANNING/WOMEN'S HEALTH CLINIC | Facility: IMMUNIZATION CENTER | Age: 36
End: 2021-05-07
Attending: INTERNAL MEDICINE
Payer: OTHER GOVERNMENT

## 2021-05-07 DIAGNOSIS — Z23 ENCOUNTER FOR VACCINATION: Primary | ICD-10-CM

## 2021-05-07 PROCEDURE — 0002A PFIZER SARS-COV-2 VACCINE: CPT | Performed by: INTERNAL MEDICINE

## 2021-05-07 PROCEDURE — 91300 PFIZER SARS-COV-2 VACCINE: CPT | Performed by: INTERNAL MEDICINE

## 2022-05-03 ENCOUNTER — HOSPITAL ENCOUNTER (EMERGENCY)
Facility: MEDICAL CENTER | Age: 37
End: 2022-05-03
Attending: EMERGENCY MEDICINE

## 2022-05-03 VITALS
WEIGHT: 171.3 LBS | RESPIRATION RATE: 20 BRPM | HEART RATE: 61 BPM | TEMPERATURE: 98.3 F | SYSTOLIC BLOOD PRESSURE: 104 MMHG | DIASTOLIC BLOOD PRESSURE: 71 MMHG | BODY MASS INDEX: 25.37 KG/M2 | HEIGHT: 69 IN | OXYGEN SATURATION: 97 %

## 2022-05-03 DIAGNOSIS — K13.0 INFECTION OF LIP: ICD-10-CM

## 2022-05-03 DIAGNOSIS — R21 RASH: ICD-10-CM

## 2022-05-03 PROCEDURE — 700102 HCHG RX REV CODE 250 W/ 637 OVERRIDE(OP): Performed by: EMERGENCY MEDICINE

## 2022-05-03 PROCEDURE — A9270 NON-COVERED ITEM OR SERVICE: HCPCS | Performed by: EMERGENCY MEDICINE

## 2022-05-03 PROCEDURE — 99283 EMERGENCY DEPT VISIT LOW MDM: CPT

## 2022-05-03 RX ORDER — CEPHALEXIN 500 MG/1
500 CAPSULE ORAL ONCE
Status: COMPLETED | OUTPATIENT
Start: 2022-05-03 | End: 2022-05-03

## 2022-05-03 RX ORDER — CEPHALEXIN 500 MG/1
500 CAPSULE ORAL 4 TIMES DAILY
Qty: 28 CAPSULE | Refills: 0 | Status: SHIPPED | OUTPATIENT
Start: 2022-05-03 | End: 2022-05-10

## 2022-05-03 RX ORDER — VALACYCLOVIR HYDROCHLORIDE 1 G/1
1000 TABLET, FILM COATED ORAL 2 TIMES DAILY
Qty: 14 TABLET | Refills: 0 | Status: SHIPPED | OUTPATIENT
Start: 2022-05-03 | End: 2022-05-10

## 2022-05-03 RX ORDER — VALACYCLOVIR HYDROCHLORIDE 500 MG/1
500 TABLET, FILM COATED ORAL ONCE
Status: COMPLETED | OUTPATIENT
Start: 2022-05-03 | End: 2022-05-03

## 2022-05-03 RX ADMIN — VALACYCLOVIR HYDROCHLORIDE 500 MG: 500 TABLET, FILM COATED ORAL at 03:53

## 2022-05-03 RX ADMIN — CEPHALEXIN 500 MG: 500 CAPSULE ORAL at 03:48

## 2022-05-03 ASSESSMENT — ENCOUNTER SYMPTOMS: FEVER: 0

## 2022-05-03 NOTE — ED NOTES
Pt ambulated to assigned room from waiting area with a steady and stable gait. [place] Noted swelling to left lower lip but no apparent distress noted and breathing is non-labored at this time. Noted equal rise and fall of chest wall. Pt able to talk in complete sentences; maintaining oral secretions without any difficulty. Will continue to monitor pt.

## 2022-05-03 NOTE — ED PROVIDER NOTES
ED Provider Note   5/3/2022  3:31 AM    Means of Arrival: Walk In  History obtained by: patient  Limitations: None    CHIEF COMPLAINT  Chief Complaint   Patient presents with   • Lip Swelling     obvious swelling to left side of lower lip. Pt stating he had a cold sore for x2 days in same spot and woke up w/ swelling this AM. Pt able to talk in complete sentences, maintains secretions.    • Rash     X1 week to R thigh, R shoulder. Denies any changes to soap or detergent.        HPI  Saman Borjas JrYonatan is a 36 y.o. male with concerns of left lower lip swelling.  Also with a rash at his right anterior shoulder.  Lip symptoms started 2 days ago.  It for started out as a cold sore at the border of his skin and left lip.  There is no drainage.  Area then became swollen and painful.  There is also some associated redness surrounding the area.  No difficulty swallowing.  No dental pain.  Rash at the right anterior shoulder was noticeable around the same timeline.  Initially painful, now painful with some itching.  He does have a history of eczema but this looks different.    Denies herpes history but has valtrex on prescription list.     On review of his records it appears that he had similar symptoms 1 year ago.  The symptoms were on his face.  He was prescribed Keflex.  There was a suspicion for herpes infection in the past and he was prescribed Valtrex.    REVIEW OF SYSTEMS  Review of Systems   Constitutional: Negative for fever.   Genitourinary: Negative for dysuria.     See HPI for further details.     PAST MEDICAL HISTORY   has a past medical history of Depression, Psychiatric disorder, and PTSD (post-traumatic stress disorder).    FAMILY HISTORY  History reviewed. No pertinent family history.    SOCIAL HISTORY  Social History     Tobacco Use   • Smoking status: Current Every Day Smoker     Packs/day: 0.25     Types: Cigarettes   • Smokeless tobacco: Former User   Vaping Use   • Vaping Use: Never used   Substance  "and Sexual Activity   • Alcohol use: Yes   • Drug use: Yes     Types: Inhaled     Comment: Marijuana   • Sexual activity: Not on file       SURGICAL HISTORY  patient denies any surgical history    CURRENT MEDICATIONS  Home Medications     Reviewed by Stacy Conroy R.N. (Registered Nurse) on 05/03/22 at 0226  Med List Status: Partial   Medication Last Dose Status   Cephalexin 500 MG Tab  Active   erythromycin 5 MG/GM Ointment  Active   ibuprofen (MOTRIN) 600 MG Tab  Active   ondansetron (ZOFRAN ODT) 4 MG TABLET DISPERSIBLE  Active   valacyclovir (VALTREX) 1 GM Tab  Active                ALLERGIES  No Known Allergies    PHYSICAL EXAM  VITAL SIGNS: /71   Pulse 61   Temp 36.8 °C (98.3 °F) (Temporal)   Resp 20   Ht 1.753 m (5' 9\")   Wt 77.7 kg (171 lb 4.8 oz)   SpO2 97%   BMI 25.30 kg/m²    Pulse ox interpretation: I interpret this pulse ox as normal.  Constitutional: Alert in no apparent distress.  HENT: Normocephalic, Atraumatic, Bilateral external ears normal. Nose normal. Left lower lip edema. Crusting pustule like lesions at lip border.  There is a palpable lymph node on the left anterior neck.  Eyes: Pupils are equal and reactive. Conjunctiva normal, non-icteric.   Heart: Regular rate and rythm  Lungs: No respiratory distress, regular respirations. Clear to auscultation bilaterally.  Skin: Right anterior shoulder with raised red rash with small vesicles at different stages of healing, mildly tender. See HENT exam.   Neurologic: Alert, Grossly non-focal. No slurred speech. Moving extremities normally.   MSK: Full range of motion of extremities without limitations.  No edema.  Psychiatric: Affect normal, Judgment normal, Mood normal, Appears appropriate and not intoxicated.   Physical Exam      COURSE & MEDICAL DECISION MAKING  Pertinent Labs & Imaging studies reviewed. (See chart for details)    3:31 AM This is an emergent evaluation of a 36 y.o., male who presents with left lower lip swelling, rash " near the lip/cold sore, right anterior shoulder rash.  The differential diagnosis includes but is not limited to herpes, cellulities/impetigo.  Treated with valtrex and keflex. Had similar presentation and treatment in the past.  I would like him to try to follow-up with a primary provider to have follow-up exam, to see if this is improving.  If this becomes recurrent may need further testing.      The patient will return for worsening symptoms and is stable at the time of discharge. The patient verbalizes understanding. Guidance was provided on appropriate use of medications including driving under the influence, overdose, and side effects.     FINAL IMPRESSION  1. Rash    2. Infection of lip        This dictation was created using voice recognition software. The accuracy of the dictation is limited to the abilities of the software. I expect there may be some errors of grammar and possibly content. The nursing notes were reviewed and certain aspects of this information were incorporated into this note.    Electronically signed by: Jorge Wilkinson II, M.D., 5/3/2022 3:31 AM

## 2022-05-03 NOTE — ED NOTES
Pt cleared for discharge by ERP.    Pt discharged in stable condition. Discharge instructions given and explained to pt and verbalized understanding. Pt ambulated out of the ER with a steady and stable gait.

## 2022-05-03 NOTE — ED TRIAGE NOTES
"Chief Complaint   Patient presents with   • Lip Swelling     obvious swelling to left side of lower lip. Pt stating he had a cold sore for x2 days in same spot and woke up w/ swelling this AM. Pt able to talk in complete sentences, maintains secretions.    • Rash     X1 week to R thigh, R shoulder. Denies any changes to soap or detergent.        Pt ambulated w/ steady gait to triage. Pt A&Ox4, on room air, pt denies any changes to foods or meds.     Pt to lobby . Pt educated on alerting staff in changes to condition. Pt verbalized understanding.     /69   Pulse 74   Temp 36.4 °C (97.6 °F) (Temporal)   Resp 19   Ht 1.753 m (5' 9\")   Wt 77.7 kg (171 lb 4.8 oz)   SpO2 97%   BMI 25.30 kg/m²   "

## 2022-12-27 ENCOUNTER — APPOINTMENT (OUTPATIENT)
Dept: RADIOLOGY | Facility: MEDICAL CENTER | Age: 37
End: 2022-12-27
Attending: EMERGENCY MEDICINE

## 2022-12-27 ENCOUNTER — HOSPITAL ENCOUNTER (EMERGENCY)
Facility: MEDICAL CENTER | Age: 37
End: 2022-12-27
Attending: EMERGENCY MEDICINE

## 2022-12-27 VITALS
BODY MASS INDEX: 27.28 KG/M2 | DIASTOLIC BLOOD PRESSURE: 60 MMHG | OXYGEN SATURATION: 95 % | HEART RATE: 67 BPM | WEIGHT: 180 LBS | SYSTOLIC BLOOD PRESSURE: 125 MMHG | HEIGHT: 68 IN | RESPIRATION RATE: 16 BRPM | TEMPERATURE: 98.6 F

## 2022-12-27 DIAGNOSIS — S30.1XXA HEMATOMA OF RIGHT FLANK, INITIAL ENCOUNTER: ICD-10-CM

## 2022-12-27 LAB
ABO GROUP BLD: NORMAL
ALBUMIN SERPL BCP-MCNC: 4.4 G/DL (ref 3.2–4.9)
ALBUMIN/GLOB SERPL: 1.8 G/DL
ALP SERPL-CCNC: 65 U/L (ref 30–99)
ALT SERPL-CCNC: 20 U/L (ref 2–50)
ANION GAP SERPL CALC-SCNC: 11 MMOL/L (ref 7–16)
APTT PPP: 28.6 SEC (ref 24.7–36)
AST SERPL-CCNC: 24 U/L (ref 12–45)
BILIRUB SERPL-MCNC: 0.3 MG/DL (ref 0.1–1.5)
BLD GP AB SCN SERPL QL: NORMAL
BUN SERPL-MCNC: 12 MG/DL (ref 8–22)
CALCIUM ALBUM COR SERPL-MCNC: 9.3 MG/DL (ref 8.5–10.5)
CALCIUM SERPL-MCNC: 9.6 MG/DL (ref 8.5–10.5)
CHLORIDE SERPL-SCNC: 106 MMOL/L (ref 96–112)
CO2 SERPL-SCNC: 23 MMOL/L (ref 20–33)
CREAT SERPL-MCNC: 1.02 MG/DL (ref 0.5–1.4)
ERYTHROCYTE [DISTWIDTH] IN BLOOD BY AUTOMATED COUNT: 40 FL (ref 35.9–50)
ETHANOL BLD-MCNC: <10.1 MG/DL
GFR SERPLBLD CREATININE-BSD FMLA CKD-EPI: 98 ML/MIN/1.73 M 2
GLOBULIN SER CALC-MCNC: 2.4 G/DL (ref 1.9–3.5)
GLUCOSE SERPL-MCNC: 108 MG/DL (ref 65–99)
HCT VFR BLD AUTO: 40 % (ref 42–52)
HGB BLD-MCNC: 13.6 G/DL (ref 14–18)
INR PPP: 1.04 (ref 0.87–1.13)
MCH RBC QN AUTO: 31.1 PG (ref 27–33)
MCHC RBC AUTO-ENTMCNC: 34 G/DL (ref 33.7–35.3)
MCV RBC AUTO: 91.3 FL (ref 81.4–97.8)
PLATELET # BLD AUTO: 196 K/UL (ref 164–446)
PMV BLD AUTO: 10.7 FL (ref 9–12.9)
POTASSIUM SERPL-SCNC: 3.8 MMOL/L (ref 3.6–5.5)
PROT SERPL-MCNC: 6.8 G/DL (ref 6–8.2)
PROTHROMBIN TIME: 13.5 SEC (ref 12–14.6)
RBC # BLD AUTO: 4.38 M/UL (ref 4.7–6.1)
RH BLD: NORMAL
SODIUM SERPL-SCNC: 140 MMOL/L (ref 135–145)
WBC # BLD AUTO: 8.1 K/UL (ref 4.8–10.8)

## 2022-12-27 PROCEDURE — 73560 X-RAY EXAM OF KNEE 1 OR 2: CPT | Mod: RT

## 2022-12-27 PROCEDURE — 700117 HCHG RX CONTRAST REV CODE 255: Performed by: EMERGENCY MEDICINE

## 2022-12-27 PROCEDURE — 80053 COMPREHEN METABOLIC PANEL: CPT

## 2022-12-27 PROCEDURE — 99285 EMERGENCY DEPT VISIT HI MDM: CPT

## 2022-12-27 PROCEDURE — 72125 CT NECK SPINE W/O DYE: CPT

## 2022-12-27 PROCEDURE — 86901 BLOOD TYPING SEROLOGIC RH(D): CPT

## 2022-12-27 PROCEDURE — 72128 CT CHEST SPINE W/O DYE: CPT

## 2022-12-27 PROCEDURE — A9270 NON-COVERED ITEM OR SERVICE: HCPCS | Performed by: EMERGENCY MEDICINE

## 2022-12-27 PROCEDURE — 71260 CT THORAX DX C+: CPT

## 2022-12-27 PROCEDURE — 36415 COLL VENOUS BLD VENIPUNCTURE: CPT

## 2022-12-27 PROCEDURE — 82077 ASSAY SPEC XCP UR&BREATH IA: CPT

## 2022-12-27 PROCEDURE — 73501 X-RAY EXAM HIP UNI 1 VIEW: CPT | Mod: RT

## 2022-12-27 PROCEDURE — 72131 CT LUMBAR SPINE W/O DYE: CPT

## 2022-12-27 PROCEDURE — 71045 X-RAY EXAM CHEST 1 VIEW: CPT

## 2022-12-27 PROCEDURE — 70450 CT HEAD/BRAIN W/O DYE: CPT

## 2022-12-27 PROCEDURE — 86850 RBC ANTIBODY SCREEN: CPT

## 2022-12-27 PROCEDURE — 700102 HCHG RX REV CODE 250 W/ 637 OVERRIDE(OP): Performed by: EMERGENCY MEDICINE

## 2022-12-27 PROCEDURE — 96374 THER/PROPH/DIAG INJ IV PUSH: CPT

## 2022-12-27 PROCEDURE — 86900 BLOOD TYPING SEROLOGIC ABO: CPT

## 2022-12-27 PROCEDURE — 73100 X-RAY EXAM OF WRIST: CPT | Mod: RT

## 2022-12-27 PROCEDURE — 85730 THROMBOPLASTIN TIME PARTIAL: CPT

## 2022-12-27 PROCEDURE — 85610 PROTHROMBIN TIME: CPT

## 2022-12-27 PROCEDURE — 85027 COMPLETE CBC AUTOMATED: CPT

## 2022-12-27 PROCEDURE — 700111 HCHG RX REV CODE 636 W/ 250 OVERRIDE (IP): Performed by: EMERGENCY MEDICINE

## 2022-12-27 PROCEDURE — 305948 HCHG GREEN TRAUMA ACT PRE-NOTIFY NO CC

## 2022-12-27 RX ORDER — CYCLOBENZAPRINE HCL 10 MG
5 TABLET ORAL ONCE
Status: COMPLETED | OUTPATIENT
Start: 2022-12-27 | End: 2022-12-27

## 2022-12-27 RX ORDER — CYCLOBENZAPRINE HCL 5 MG
5 TABLET ORAL 3 TIMES DAILY PRN
Qty: 6 TABLET | Refills: 0 | Status: SHIPPED | OUTPATIENT
Start: 2022-12-27 | End: 2022-12-29

## 2022-12-27 RX ADMIN — IOHEXOL 100 ML: 350 INJECTION, SOLUTION INTRAVENOUS at 19:30

## 2022-12-27 RX ADMIN — CYCLOBENZAPRINE 5 MG: 10 TABLET, FILM COATED ORAL at 20:15

## 2022-12-27 RX ADMIN — FENTANYL CITRATE 50 MCG: 50 INJECTION, SOLUTION INTRAMUSCULAR; INTRAVENOUS at 18:45

## 2022-12-27 ASSESSMENT — LIFESTYLE VARIABLES: DO YOU DRINK ALCOHOL: NO

## 2022-12-28 NOTE — ED NOTES
Pt AOx4 and ready for education. All discharge instructions given to pt as well as Rx for Flexeril.  Pt advised not to drink or drive.  Pt verbalized understanding of all discharge instructions. All lines removed prior to discharge. All questions answered. Pt to lobby via ambulation.

## 2022-12-28 NOTE — PROCEDURES
IV infiltrate, warm compress applied. Pt tolerating treatment well. Tim Reyes notified, marylou entered

## 2022-12-28 NOTE — ED NOTES
Per CT tech, pt's IV infiltrated during his scan. New IV placed, CT notified and transporting pt to CT now

## 2022-12-28 NOTE — ED NOTES
Assumed care of pt, received report from JOSE LUIS Lam. Safety rounds completed, pt resting comfortably in the room.
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
## 2023-03-30 ENCOUNTER — HOSPITAL ENCOUNTER (EMERGENCY)
Facility: MEDICAL CENTER | Age: 38
End: 2023-03-30
Attending: EMERGENCY MEDICINE

## 2023-03-30 ENCOUNTER — APPOINTMENT (OUTPATIENT)
Dept: RADIOLOGY | Facility: MEDICAL CENTER | Age: 38
End: 2023-03-30
Attending: EMERGENCY MEDICINE

## 2023-03-30 VITALS
OXYGEN SATURATION: 96 % | SYSTOLIC BLOOD PRESSURE: 124 MMHG | WEIGHT: 195 LBS | TEMPERATURE: 98.8 F | HEART RATE: 76 BPM | BODY MASS INDEX: 28.88 KG/M2 | HEIGHT: 69 IN | RESPIRATION RATE: 15 BRPM | DIASTOLIC BLOOD PRESSURE: 84 MMHG

## 2023-03-30 DIAGNOSIS — M79.671 FOOT PAIN, RIGHT: ICD-10-CM

## 2023-03-30 DIAGNOSIS — S90.31XA CONTUSION OF RIGHT FOOT, INITIAL ENCOUNTER: ICD-10-CM

## 2023-03-30 PROCEDURE — 99284 EMERGENCY DEPT VISIT MOD MDM: CPT

## 2023-03-30 PROCEDURE — 73630 X-RAY EXAM OF FOOT: CPT | Mod: RT

## 2023-03-30 PROCEDURE — A9270 NON-COVERED ITEM OR SERVICE: HCPCS | Performed by: EMERGENCY MEDICINE

## 2023-03-30 PROCEDURE — 700102 HCHG RX REV CODE 250 W/ 637 OVERRIDE(OP): Performed by: EMERGENCY MEDICINE

## 2023-03-30 RX ORDER — ACETAMINOPHEN 325 MG/1
650 TABLET ORAL ONCE
Status: COMPLETED | OUTPATIENT
Start: 2023-03-30 | End: 2023-03-30

## 2023-03-30 RX ORDER — IBUPROFEN 600 MG/1
600 TABLET ORAL ONCE
Status: COMPLETED | OUTPATIENT
Start: 2023-03-30 | End: 2023-03-30

## 2023-03-30 RX ADMIN — IBUPROFEN 600 MG: 600 TABLET, FILM COATED ORAL at 14:39

## 2023-03-30 RX ADMIN — ACETAMINOPHEN 650 MG: 325 TABLET, FILM COATED ORAL at 14:39

## 2023-03-30 ASSESSMENT — FIBROSIS 4 INDEX: FIB4 SCORE: .95831484749990987

## 2023-03-30 ASSESSMENT — PAIN DESCRIPTION - DESCRIPTORS: DESCRIPTORS: CRAMPING

## 2023-03-30 NOTE — ED NOTES
Pt provided with discharge instructions. Pt verbalized understanding. Pt assisted out of ed with limping gait.

## 2023-03-30 NOTE — ED PROVIDER NOTES
"ED Provider Note    CHIEF COMPLAINT  Chief Complaint   Patient presents with    Foot Pain     Pt had a 5 lb dumbbell fall on top of his right foot, pain 10/10, pt unable to put weight on foot.           HPI/ROS  LIMITATION TO HISTORY   Select: : None  OUTSIDE HISTORIAN(S):  none    Saman Borjas Jr. is a 37 y.o. male who presents for evaluation of right foot pain.  He dropped a dumbbell on his foot at the gym earlier today.  He cannot put any weight on it.  It is a burning-like pain that radiates into the arch of his foot.  He has broken his other foot in the past and feels similar to that.  He has not taken anything prior to arrival.    PAST MEDICAL HISTORY   has a past medical history of Depression, Psychiatric disorder, and PTSD (post-traumatic stress disorder).    SURGICAL HISTORY  patient denies any surgical history    FAMILY HISTORY  History reviewed. No pertinent family history.    SOCIAL HISTORY  Social History     Tobacco Use    Smoking status: Every Day     Packs/day: 0.50     Types: Cigarettes    Smokeless tobacco: Former   Vaping Use    Vaping Use: Never used   Substance and Sexual Activity    Alcohol use: Yes    Drug use: Yes     Types: Inhaled     Comment: Marijuana    Sexual activity: Not on file       CURRENT MEDICATIONS  Home Medications       Reviewed by Boaz Garza R.N. (Registered Nurse) on 03/30/23 at 1358  Med List Status: Not Addressed     Medication Last Dose Status   erythromycin 5 MG/GM Ointment  Active   ibuprofen (MOTRIN) 600 MG Tab  Active   ondansetron (ZOFRAN ODT) 4 MG TABLET DISPERSIBLE  Active                    ALLERGIES  No Known Allergies    PHYSICAL EXAM  VITAL SIGNS: /84   Pulse 76   Temp 37.1 °C (98.8 °F) (Temporal)   Resp 15   Ht 1.753 m (5' 9\")   Wt 88.5 kg (195 lb)   SpO2 96%   BMI 28.80 kg/m²      Constitutional: Alert in no apparent distress. Well apearing  HENT: Normocephalic, Atraumatic, Bilateral external ears normal. Nose normal.   Eyes:  " Conjunctiva normal, non-icteric.   Lungs: Non-labored respirations  Skin: Warm, Dry, No erythema, No rash.   Neurologic: Alert, Grossly non-focal.   Psychiatric: Affect normal, Judgment normal, Mood normal, Appears appropriate and not intoxicated.   Extremities: Right foot pain and tenderness at the base of the fifth metatarsal.  No obvious deformity    DIAGNOSTIC STUDIES / PROCEDURES      RADIOLOGY  I have independently interpreted the diagnostic imaging associated with this visit and am waiting the final reading from the radiologist.   My preliminary interpretation is as follows: No obvious fracture on x-ray.  Radiologist interpretation:   DX-FOOT-COMPLETE 3+ RIGHT   Final Result      No acute fracture or dislocation is noted.            COURSE & MEDICAL DECISION MAKING    ED Observation Status? No; Patient does not meet criteria for ED Observation.     INITIAL ASSESSMENT, COURSE AND PLAN  Care Narrative: This is a 37-year-old gentleman that presents with right foot pain after dumbbell fell on it.  X-ray does not show any evidence of fracture he will be discharged with supportive care.  He is agreeable to this plan.        DISPOSITION AND DISCUSSIONS      Escalation of care considered, and ultimately not performed:blood analysis    Barriers to care at this time, including but not limited to:  none .      The patient will return for new or worsening symptoms and is stable at the time of discharge. Patient was given return precautions. Patient and/or family member verbalizes understanding and will comply.    DISPOSITION:  Patient will be discharged home in stable condition.    FOLLOW UP:  [facility], Emergency Dept  [facility] [facility] [id number] [phone]    Return to the emergency department for worsening pain, swelling or other concerns.      OUTPATIENT MEDICATIONS:  Discharge Medication List as of 3/30/2023  3:16 PM            FINAL DIAGNOSIS  1. Foot pain, right    2. Contusion  of right foot, initial encounter           Electronically signed by: Joycelyn Sullivan M.D., 3/30/2023 2:30 PM

## 2023-03-30 NOTE — ED TRIAGE NOTES
"Chief Complaint   Patient presents with    Foot Pain     Pt had a 5 lb dumbbell fall on top of his right foot, pain 10/10, pt unable to put weight on foot.       Pt ambulatory to triage for above complaint.      Pt is alert/oriented and follows commands. Pt speaking in full sentences and responds appropriately to questions. No acute distress noted in triage and respirations are even and unlabored.     Pt placed in lobby and educated on triage process. Pt encouraged to alert staff for any changes in condition.    BP (!) 128/95   Pulse 93   Temp 37.2 °C (99 °F) (Temporal)   Resp 16   Ht 1.753 m (5' 9\")   Wt 88.5 kg (195 lb)   SpO2 96%   BMI 28.80 kg/m²     "

## 2023-06-25 ENCOUNTER — HOSPITAL ENCOUNTER (EMERGENCY)
Facility: MEDICAL CENTER | Age: 38
End: 2023-06-25
Attending: EMERGENCY MEDICINE

## 2023-06-25 ENCOUNTER — APPOINTMENT (OUTPATIENT)
Dept: RADIOLOGY | Facility: MEDICAL CENTER | Age: 38
End: 2023-06-25
Attending: EMERGENCY MEDICINE

## 2023-06-25 VITALS
BODY MASS INDEX: 28.88 KG/M2 | DIASTOLIC BLOOD PRESSURE: 51 MMHG | WEIGHT: 195 LBS | TEMPERATURE: 98.6 F | RESPIRATION RATE: 21 BRPM | OXYGEN SATURATION: 95 % | SYSTOLIC BLOOD PRESSURE: 92 MMHG | HEIGHT: 69 IN | HEART RATE: 79 BPM

## 2023-06-25 DIAGNOSIS — R11.2 NAUSEA AND VOMITING, UNSPECIFIED VOMITING TYPE: ICD-10-CM

## 2023-06-25 DIAGNOSIS — R10.84 GENERALIZED ABDOMINAL PAIN: ICD-10-CM

## 2023-06-25 LAB
ALBUMIN SERPL BCP-MCNC: 4.8 G/DL (ref 3.2–4.9)
ALBUMIN/GLOB SERPL: 2.2 G/DL
ALP SERPL-CCNC: 69 U/L (ref 30–99)
ALT SERPL-CCNC: 23 U/L (ref 2–50)
ANION GAP SERPL CALC-SCNC: 12 MMOL/L (ref 7–16)
APPEARANCE UR: CLEAR
AST SERPL-CCNC: 24 U/L (ref 12–45)
BASOPHILS # BLD AUTO: 0.5 % (ref 0–1.8)
BASOPHILS # BLD: 0.06 K/UL (ref 0–0.12)
BILIRUB SERPL-MCNC: 0.6 MG/DL (ref 0.1–1.5)
BILIRUB UR QL STRIP.AUTO: NEGATIVE
BUN SERPL-MCNC: 15 MG/DL (ref 8–22)
CALCIUM ALBUM COR SERPL-MCNC: 8.4 MG/DL (ref 8.5–10.5)
CALCIUM SERPL-MCNC: 9 MG/DL (ref 8.5–10.5)
CHLORIDE SERPL-SCNC: 104 MMOL/L (ref 96–112)
CO2 SERPL-SCNC: 23 MMOL/L (ref 20–33)
COLOR UR: YELLOW
CREAT SERPL-MCNC: 0.89 MG/DL (ref 0.5–1.4)
EKG IMPRESSION: NORMAL
EOSINOPHIL # BLD AUTO: 0 K/UL (ref 0–0.51)
EOSINOPHIL NFR BLD: 0 % (ref 0–6.9)
ERYTHROCYTE [DISTWIDTH] IN BLOOD BY AUTOMATED COUNT: 40.8 FL (ref 35.9–50)
ETHANOL BLD-MCNC: <10.1 MG/DL
GFR SERPLBLD CREATININE-BSD FMLA CKD-EPI: 114 ML/MIN/1.73 M 2
GLOBULIN SER CALC-MCNC: 2.2 G/DL (ref 1.9–3.5)
GLUCOSE SERPL-MCNC: 114 MG/DL (ref 65–99)
GLUCOSE UR STRIP.AUTO-MCNC: NEGATIVE MG/DL
HCT VFR BLD AUTO: 43.7 % (ref 42–52)
HGB BLD-MCNC: 14.7 G/DL (ref 14–18)
IMM GRANULOCYTES # BLD AUTO: 0.06 K/UL (ref 0–0.11)
IMM GRANULOCYTES NFR BLD AUTO: 0.5 % (ref 0–0.9)
KETONES UR STRIP.AUTO-MCNC: 40 MG/DL
LEUKOCYTE ESTERASE UR QL STRIP.AUTO: NEGATIVE
LIPASE SERPL-CCNC: 15 U/L (ref 11–82)
LYMPHOCYTES # BLD AUTO: 1.41 K/UL (ref 1–4.8)
LYMPHOCYTES NFR BLD: 11.2 % (ref 22–41)
MCH RBC QN AUTO: 31.2 PG (ref 27–33)
MCHC RBC AUTO-ENTMCNC: 33.6 G/DL (ref 32.3–36.5)
MCV RBC AUTO: 92.8 FL (ref 81.4–97.8)
MICRO URNS: ABNORMAL
MONOCYTES # BLD AUTO: 0.52 K/UL (ref 0–0.85)
MONOCYTES NFR BLD AUTO: 4.1 % (ref 0–13.4)
NEUTROPHILS # BLD AUTO: 10.59 K/UL (ref 1.82–7.42)
NEUTROPHILS NFR BLD: 83.7 % (ref 44–72)
NITRITE UR QL STRIP.AUTO: NEGATIVE
NRBC # BLD AUTO: 0 K/UL
NRBC BLD-RTO: 0 /100 WBC (ref 0–0.2)
PH UR STRIP.AUTO: 6 [PH] (ref 5–8)
PLATELET # BLD AUTO: 250 K/UL (ref 164–446)
PMV BLD AUTO: 10.7 FL (ref 9–12.9)
POTASSIUM SERPL-SCNC: 3.7 MMOL/L (ref 3.6–5.5)
PROT SERPL-MCNC: 7 G/DL (ref 6–8.2)
PROT UR QL STRIP: NEGATIVE MG/DL
RBC # BLD AUTO: 4.71 M/UL (ref 4.7–6.1)
RBC UR QL AUTO: NEGATIVE
SODIUM SERPL-SCNC: 139 MMOL/L (ref 135–145)
SP GR UR STRIP.AUTO: >=1.045
UROBILINOGEN UR STRIP.AUTO-MCNC: 0.2 MG/DL
WBC # BLD AUTO: 12.6 K/UL (ref 4.8–10.8)

## 2023-06-25 PROCEDURE — 85025 COMPLETE CBC W/AUTO DIFF WBC: CPT

## 2023-06-25 PROCEDURE — 700105 HCHG RX REV CODE 258: Performed by: EMERGENCY MEDICINE

## 2023-06-25 PROCEDURE — 83690 ASSAY OF LIPASE: CPT

## 2023-06-25 PROCEDURE — 36415 COLL VENOUS BLD VENIPUNCTURE: CPT

## 2023-06-25 PROCEDURE — 700111 HCHG RX REV CODE 636 W/ 250 OVERRIDE (IP): Performed by: EMERGENCY MEDICINE

## 2023-06-25 PROCEDURE — 80053 COMPREHEN METABOLIC PANEL: CPT

## 2023-06-25 PROCEDURE — 81003 URINALYSIS AUTO W/O SCOPE: CPT

## 2023-06-25 PROCEDURE — 96375 TX/PRO/DX INJ NEW DRUG ADDON: CPT

## 2023-06-25 PROCEDURE — 700117 HCHG RX CONTRAST REV CODE 255: Performed by: EMERGENCY MEDICINE

## 2023-06-25 PROCEDURE — 82077 ASSAY SPEC XCP UR&BREATH IA: CPT

## 2023-06-25 PROCEDURE — 99285 EMERGENCY DEPT VISIT HI MDM: CPT

## 2023-06-25 PROCEDURE — 96374 THER/PROPH/DIAG INJ IV PUSH: CPT

## 2023-06-25 PROCEDURE — 74177 CT ABD & PELVIS W/CONTRAST: CPT

## 2023-06-25 PROCEDURE — 93005 ELECTROCARDIOGRAM TRACING: CPT | Performed by: EMERGENCY MEDICINE

## 2023-06-25 PROCEDURE — 93005 ELECTROCARDIOGRAM TRACING: CPT

## 2023-06-25 RX ORDER — KETOROLAC TROMETHAMINE 30 MG/ML
15 INJECTION, SOLUTION INTRAMUSCULAR; INTRAVENOUS ONCE
Status: COMPLETED | OUTPATIENT
Start: 2023-06-25 | End: 2023-06-25

## 2023-06-25 RX ORDER — ONDANSETRON 2 MG/ML
4 INJECTION INTRAMUSCULAR; INTRAVENOUS EVERY 4 HOURS PRN
Status: DISCONTINUED | OUTPATIENT
Start: 2023-06-25 | End: 2023-06-25 | Stop reason: HOSPADM

## 2023-06-25 RX ORDER — ONDANSETRON 4 MG/1
4 TABLET, ORALLY DISINTEGRATING ORAL EVERY 6 HOURS PRN
Qty: 10 TABLET | Refills: 0 | Status: SHIPPED | OUTPATIENT
Start: 2023-06-25 | End: 2023-07-05

## 2023-06-25 RX ORDER — SODIUM CHLORIDE 9 MG/ML
1000 INJECTION, SOLUTION INTRAVENOUS ONCE
Status: COMPLETED | OUTPATIENT
Start: 2023-06-25 | End: 2023-06-25

## 2023-06-25 RX ADMIN — ONDANSETRON 4 MG: 2 INJECTION INTRAMUSCULAR; INTRAVENOUS at 11:26

## 2023-06-25 RX ADMIN — KETOROLAC TROMETHAMINE 15 MG: 30 INJECTION, SOLUTION INTRAMUSCULAR; INTRAVENOUS at 11:57

## 2023-06-25 RX ADMIN — IOHEXOL 100 ML: 350 INJECTION, SOLUTION INTRAVENOUS at 13:45

## 2023-06-25 RX ADMIN — SODIUM CHLORIDE 1000 ML: 9 INJECTION, SOLUTION INTRAVENOUS at 11:26

## 2023-06-25 ASSESSMENT — FIBROSIS 4 INDEX: FIB4 SCORE: .95831484749990987

## 2023-06-25 NOTE — ED NOTES
Medicated for pain per mar order. Allergies verified. Urinal placed at bedside. Informed to call staff once able to provide urine sample or if needing any assistance.
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
## 2023-06-25 NOTE — ED NOTES
Chief Complaint   Patient presents with    Syncope    Abdominal Pain    Nausea/Vomiting/Diarrhea         Pt bib ems for syncopal episode while at work. Pt denies hitting head.   Pt said that he has been having n/v/d and abdominal pain x2days.   Fsbs 132  He was given 400cc ivf pta